# Patient Record
Sex: FEMALE | Race: WHITE | URBAN - METROPOLITAN AREA
[De-identification: names, ages, dates, MRNs, and addresses within clinical notes are randomized per-mention and may not be internally consistent; named-entity substitution may affect disease eponyms.]

---

## 2018-01-03 ENCOUNTER — OFFICE VISIT (OUTPATIENT)
Dept: FAMILY MEDICINE | Facility: CLINIC | Age: 23
End: 2018-01-03
Payer: COMMERCIAL

## 2018-01-03 VITALS
OXYGEN SATURATION: 99 % | WEIGHT: 123.6 LBS | HEIGHT: 68 IN | BODY MASS INDEX: 18.73 KG/M2 | HEART RATE: 51 BPM | DIASTOLIC BLOOD PRESSURE: 71 MMHG | TEMPERATURE: 98.3 F | SYSTOLIC BLOOD PRESSURE: 110 MMHG

## 2018-01-03 DIAGNOSIS — E55.9 VITAMIN D DEFICIENCY: ICD-10-CM

## 2018-01-03 DIAGNOSIS — R53.83 FATIGUE, UNSPECIFIED TYPE: ICD-10-CM

## 2018-01-03 DIAGNOSIS — F43.23 ADJUSTMENT DISORDER WITH MIXED ANXIETY AND DEPRESSED MOOD: Primary | ICD-10-CM

## 2018-01-03 LAB
BASOPHILS # BLD AUTO: 0 10E9/L (ref 0–0.2)
BASOPHILS NFR BLD AUTO: 0.5 %
DIFFERENTIAL METHOD BLD: NORMAL
EOSINOPHIL # BLD AUTO: 0.2 10E9/L (ref 0–0.7)
EOSINOPHIL NFR BLD AUTO: 3.2 %
ERYTHROCYTE [DISTWIDTH] IN BLOOD BY AUTOMATED COUNT: 12.1 % (ref 10–15)
HCT VFR BLD AUTO: 40.6 % (ref 35–47)
HGB BLD-MCNC: 13.5 G/DL (ref 11.7–15.7)
LYMPHOCYTES # BLD AUTO: 1.9 10E9/L (ref 0.8–5.3)
LYMPHOCYTES NFR BLD AUTO: 28.7 %
MCH RBC QN AUTO: 29.3 PG (ref 26.5–33)
MCHC RBC AUTO-ENTMCNC: 33.3 G/DL (ref 31.5–36.5)
MCV RBC AUTO: 88 FL (ref 78–100)
MONOCYTES # BLD AUTO: 0.6 10E9/L (ref 0–1.3)
MONOCYTES NFR BLD AUTO: 9.2 %
NEUTROPHILS # BLD AUTO: 3.9 10E9/L (ref 1.6–8.3)
NEUTROPHILS NFR BLD AUTO: 58.4 %
PLATELET # BLD AUTO: 247 10E9/L (ref 150–450)
RBC # BLD AUTO: 4.61 10E12/L (ref 3.8–5.2)
WBC # BLD AUTO: 6.6 10E9/L (ref 4–11)

## 2018-01-03 PROCEDURE — 82728 ASSAY OF FERRITIN: CPT | Performed by: PHYSICIAN ASSISTANT

## 2018-01-03 PROCEDURE — 36415 COLL VENOUS BLD VENIPUNCTURE: CPT | Performed by: PHYSICIAN ASSISTANT

## 2018-01-03 PROCEDURE — 80050 GENERAL HEALTH PANEL: CPT | Performed by: PHYSICIAN ASSISTANT

## 2018-01-03 PROCEDURE — 99214 OFFICE O/P EST MOD 30 MIN: CPT | Performed by: PHYSICIAN ASSISTANT

## 2018-01-03 PROCEDURE — 82306 VITAMIN D 25 HYDROXY: CPT | Performed by: PHYSICIAN ASSISTANT

## 2018-01-03 ASSESSMENT — PATIENT HEALTH QUESTIONNAIRE - PHQ9
5. POOR APPETITE OR OVEREATING: SEVERAL DAYS
SUM OF ALL RESPONSES TO PHQ QUESTIONS 1-9: 7

## 2018-01-03 ASSESSMENT — ANXIETY QUESTIONNAIRES
3. WORRYING TOO MUCH ABOUT DIFFERENT THINGS: NOT AT ALL
6. BECOMING EASILY ANNOYED OR IRRITABLE: MORE THAN HALF THE DAYS
2. NOT BEING ABLE TO STOP OR CONTROL WORRYING: MORE THAN HALF THE DAYS
5. BEING SO RESTLESS THAT IT IS HARD TO SIT STILL: NOT AT ALL
1. FEELING NERVOUS, ANXIOUS, OR ON EDGE: SEVERAL DAYS
GAD7 TOTAL SCORE: 6
7. FEELING AFRAID AS IF SOMETHING AWFUL MIGHT HAPPEN: NOT AT ALL

## 2018-01-03 NOTE — PROGRESS NOTES
SUBJECTIVE:                                                    Barb Middleton is a 22 year old female who presents to clinic today for the following health issues:    Abnormal Mood Symptoms  Onset: Started in October    Description:   Depression: YES  Anxiety: YES    Accompanying Signs & Symptoms:  Still participating in activities that you used to enjoy: YES    Fatigue: YES- has always been tired and sleeps a lot which she said that has been normal for her. Always takes naps  Irritability: YES  Difficulty concentrating: YES  Changes in appetite: no  Problems with sleep: will sometimes have difficulties fall asleep  Heart racing/beating fast : no  Thoughts of hurting yourself or others: sometimes pictures things in her head     History:   Recent stress: YES- with school and one teacher who has been giving her a really hard time  Prior depression hospitalization: None  Family history of depression: no  Family history of anxiety: no    Precipitating factors:   Alcohol/drug use: no    Alleviating factors:  None    Therapies Tried and outcome: None    [employer], 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 She thinks about her family  Denies drug, alcohol use  Likes to run on treadmill, yoga, group exercise classes  Wants to do counseling  She broke up with her boyfriend, had falling out with roommate, and then issues with this professor. She had brought in homework late, was crying in his office, and he wasn't working with her to remedy situation    She usually does better spring semester  Doesn't feel like she has many friends at school, or friends she is close enough to talk about this with   She normally cries easily.    Problem list and histories reviewed & adjusted, as indicated.  Additional history: none    Patient Active Problem List   Diagnosis      "Epistaxis     CARDIOVASCULAR SCREENING; LDL GOAL LESS THAN 160     History reviewed. No pertinent surgical history.    Social History   Substance Use Topics     Smoking status: Never Smoker     Smokeless tobacco: Never Used     Alcohol use No     Family History   Problem Relation Age of Onset     Depression Cousin              ROS:  Other than noted above, general, HEENT, respiratory, cardiac, MS, and gastrointestinal systems are negative.     OBJECTIVE:                                                    /71 (BP Location: Right arm, Patient Position: Sitting, Cuff Size: Adult Regular)  Pulse 51  Temp 98.3  F (36.8  C) (Tympanic)  Ht 5' 8.25\" (1.734 m)  Wt 123 lb 9.6 oz (56.1 kg)  LMP 12/24/2017  SpO2 99%  BMI 18.66 kg/m2 Body mass index is 18.66 kg/(m^2).   GENERAL: healthy, alert, well nourished, well hydrated, no distress  RESP: lungs clear to auscultation - no rales, no rhonchi, no wheezes  CV: regular rates and rhythm, normal S1 S2, no S3 or S4 and no murmur, no click or rub -  ABDOMEN: soft, no tenderness, no  hepatosplenomegaly, no masses, normal bowel sounds  PSYCH: Alert and oriented times 3; speech- coherent , normal rate and volume; able to articulate logical thoughts, able to abstract reason, no tangential thoughts, no hallucinations or delusions, affect- tearful       ASSESSMENT/PLAN:                                                      ASSESSMENT/PLAN:      ICD-10-CM    1. Adjustment disorder with mixed anxiety and depressed mood F43.23    2. Fatigue, unspecified type R53.83 CBC with platelets differential     Comprehensive metabolic panel     Ferritin     TSH with free T4 reflex     Vitamin D Deficiency   3. Vitamin D deficiency E55.9 **Vitamin D Deficiency FUTURE anytime     cholecalciferol (VITAMIN D3) 85303 UNITS capsule     Likely adjustment/reaction to stressful situations; she is trying to work on some of these situations. Will check baseline labwork.  We discussed options including " SSRI - patient defers at this time. She would like to start counseling back at school, encouraged this. Discussed management of stress/depression. Follow up here or with student health as needed.    Patient Instructions     When you're feeling overwhelmed: mindfulness techniques    MY DEPRESSION SURVIVAL KIT    30 minutes was spent face to face with the patient today discussing history, diagnosis, and follow-up plan as noted above. Over 50% of the visit was spent in counseling and coordination of care. Total Visit Time: 30 minutes.     Azul Amador PA-C   [facility]

## 2018-01-03 NOTE — MR AVS SNAPSHOT
After Visit Summary   1/3/2018    Barb Middleton    MRN: [id number]           Patient Information     Date Of Birth          1995        Visit Information        Provider Department      1/3/2018 1:20 PM Azul Amador PA-C [facility]        Today's Diagnoses     Fatigue, unspecified type    -  1      Care Instructions    When you're feeling overwhelmed:  1. Deep breathing from your diaphragm. Put your hand over your belly if that helps. Breathe in through your nose, hold, out through your mouth  2. Muscle tension/relaxation - squeeze your fists/forearms then release them to release tension  3. Grounding yourself. 5-4-3-2-1. 5- things you see, 4- things you feel, 3- things you hear, 2- things you taste/smell, 1- how am I feeling? What's one good thing about myself?  4. Thoughts turn to feelings turn to actions. You'll notice this when a negative thought can make you feel anxious or down, and in turn you act differently.  5. So turn around the negative thought by counteracting it with a positive one. What can the positive side of you say to that negative thought?  7. Think: when I worry I feel like I'm accomplishing something but I'm not. When is a good time to worry, when is a bad time?    Visit www.helpguide.org for stress tools  Visit www.stressTrly Uniq.[employer] for guided meditation    MY DEPRESSION SURVIVAL KIT    If you feel you are a danger to yourself or others, and you have a plan to hurt yourself: please call 911, go to ER, or call suicide hotline 1-516.248.9441    My Support System    My Support System     Friend 1     Friend 2     Friend 3     Family 1     Family 2     [employer]/     Therapist     24 Hr Suicide prevention hotline Toll free 1-159-644-TALK(0702)        Resource         A  feel good  movie: (something that makes you laugh, gives you hope).  Power music: (tunes that make you feel energized).   A list of things that make you feel good and relaxed  "(buying flowers, getting a massage, playing with puppies or kittens )      DEPRESSION: PATIENT SELF CARE PLAN  Here are some suggestions that have helped others with depression and may be helpful for you.    Medication    Take your medication as prescribed.  Missing doses can result in serious side effects. Do not stop your medication abruptly without first discussing it with your provider.  If you have side effects, or do not like the medication call your provider.    Get Quality Sleep    Control your stress    Try breathing deep, Take a deep breath while counting to 4, hold your breath for 3 and then slowly let it out counting to 4.      Read reputable self-help books and websites   www.helpUvinum.org  http://www.helpUvinum.org/mental/stress_relief_meditation_yoga_relaxation.htm  Several websites, such as [employer], Blue Pages, and [employer], have been shown in randomized trials to help improve depressive symptoms    Hygiene   Maintain good hygiene (Get out of bed in the morning, Make your bed, brush your teeth, shave, take a shower, and get dressed like you were going to work, even if you are unemployed).  If your clothes don't fit try to get ones that do.    Exercise  Get some form of exercise, every day. This will help reduce pain and release endorphins, the  feel good  chemicals in your brain. This is almost as good as taking antidepressants! The hardest part about exercising is getting started.  It can be as simple as just going for a walk or doing some gardening, dancing, biking, anything that will get you moving.  It is easy to find excuses not to exercise especially when you are depressed, so make a point of at least starting to exercise no matter what.     Staying Connected With Others  Stay in touch with friends, family members, and your primary care provider/team.  Have a \"check in\" with someone every day.  Take turns talking about the best and worst part of your day, and something you are proud of you did that " day.    Get out of the house regularly, such as a simple trip to the library, grocery store, take in a presentation at your [employer], community education, or go to a school event such as high school basketball game or play.    Use your imagination Be creative.  We all have a creative side; it doesn t matter if it s oil painting, sand castles, or mud pies! This will also kick up the endorphins.      Witness Beauty  (AKA stop and smell the roses) Take a look outside, even in mid-winter. Notice colors, textures. Watch the squirrels and birds.  Notice and look for the goodness in others.      Counseling  [facility] [phone]    Service to others  Be of service to others.  There is always someone else in need.  By helping others we can  get out of ourselves  and remember the really important things.  This also provides opportunities for practicing all the other parts of the program.    Humor  Laugh and be silly!  Adjust your TV habits for less news and crime-drama and more comedy.    Diet  Some foods that are helpful in depression are: fish or fish oil, fresh fruits and vegetables. Blueberries and other berries are comfort foods that are good for you.      Consider writing in a journal    Journaling can improve mood by allowing you to express pain, anger, fear or other emotions.     Join a support group  Connecting with others facing similar challenges can help you cope. Local support groups for depression are available in many communities, and support groups for depression are also offered online.     Don't make important decisions when you're down  Avoid decision making when you're feeling very depressed, since you may not be thinking clearly.             Follow-ups after your visit        Who to contact     Normal or non-critical lab and imaging results will be communicated to you by MyChart, letter or phone within 4 business days after the clinic has received the results. If you do not hear from us  "within 7 days, please contact the clinic through Evernote or phone. If you have a critical or abnormal lab result, we will notify you by phone as soon as possible.  Submit refill requests through Evernote or call your pharmacy and they will forward the refill request to us. Please allow 3 business days for your refill to be completed.          If you need to speak with a  for additional information , please call: [phone]             Additional Information About Your Visit        Evernote Information     Evernote lets you send messages to your doctor, view your test results, renew your prescriptions, schedule appointments and more. To sign up, go to [email or web address] . Click on \"Log in\" on the left side of the screen, which will take you to the Welcome page. Then click on \"Sign up Now\" on the right side of the page.     You will be asked to enter the access code listed below, as well as some personal information. Please follow the directions to create your username and password.     Your access code is: [email or web address]  Expires: 4/3/2018  2:29 PM     Your access code will  in 90 days. If you need help or a new code, please call your [facility] clinic or [phone].        Care EveryWhere ID     This is your Care EveryWhere ID. This could be used by other organizations to access your [facility] medical records  [id number]        Your Vitals Were     Pulse Temperature Height Last Period Pulse Oximetry BMI (Body Mass Index)    51 98.3  F (36.8  C) (Tympanic) 5' 8.25\" (1.734 m) 2017 99% 18.66 kg/m2       Blood Pressure from Last 3 Encounters:   18 110/71   16 96/59   08/04/15 94/66    Weight from Last 3 Encounters:   18 123 lb 9.6 oz (56.1 kg)   16 123 lb 6.4 oz (56 kg) (43 %)*   08/04/15 122 lb (55.3 kg) (43 %)*     * Growth percentiles are based on CDC 2-20 Years data.              We Performed the Following     CBC with platelets differential     Comprehensive " metabolic panel     Ferritin     TSH with free T4 reflex     Vitamin D Deficiency        Primary Care Provider Office Phone # Fax #    [facility] [phone] [address] [facility] [address]        Equal Access to Services     VINH MCDOWELL : Hadii aad ku haddottieo Sogalinaali, waaxda luqadaha, qaybta kaalmada adeegyada, ml crockettn gasper hughes yuri frederick. So [facility] [phone].    ATENCIÓN: Si habla español, tiene a hawk disposición servicios gratuitos de asistencia lingüística. Llame al [phone].    We comply with applicable federal civil rights laws and [place] laws. We do not discriminate on the basis of race, color, national origin, age, disability, sex, sexual orientation, or gender identity.            Thank you!     Thank you for choosing [facility]  for your care. Our goal is always to provide you with excellent care. Hearing back from our patients is one way we can continue to improve our services. Please take a few minutes to complete the written survey that you may receive in the mail after your visit with us. Thank you!             Your Updated Medication List - Protect others around you: Learn how to safely use, store and throw away your medicines at www.disposemymeds.org.      Notice  As of 1/3/2018  2:29 PM    You have not been prescribed any medications.

## 2018-01-03 NOTE — PATIENT INSTRUCTIONS
When you're feeling overwhelmed:  1. Deep breathing from your diaphragm. Put your hand over your belly if that helps. Breathe in through your nose, hold, out through your mouth  2. Muscle tension/relaxation - squeeze your fists/forearms then release them to release tension  3. Grounding yourself. 5-4-3-2-1. 5- things you see, 4- things you feel, 3- things you hear, 2- things you taste/smell, 1- how am I feeling? What's one good thing about myself?  4. Thoughts turn to feelings turn to actions. You'll notice this when a negative thought can make you feel anxious or down, and in turn you act differently.  5. So turn around the negative thought by counteracting it with a positive one. What can the positive side of you say to that negative thought?  7. Think: when I worry I feel like I'm accomplishing something but I'm not. When is a good time to worry, when is a bad time?    Visit www.helpRxVault.inide.org for stress tools  Visit www.stressNimble.[employer] for guided meditation    MY DEPRESSION SURVIVAL KIT    If you feel you are a danger to yourself or others, and you have a plan to hurt yourself: please call 911, go to ER, or call suicide hotline 1-819.603.1692    My Support System    My Support System     Friend 1     Friend 2     Friend 3     Family 1     Family 2     [employer]/     Therapist     24 Hr Suicide prevention hotline Toll free 9-006-521-TALK(3791)        Resource         A  feel good  movie: (something that makes you laugh, gives you hope).  Power music: (tunes that make you feel energized).   A list of things that make you feel good and relaxed (buying flowers, getting a massage, playing with puppies or kittens )      DEPRESSION: PATIENT SELF CARE PLAN  Here are some suggestions that have helped others with depression and may be helpful for you.    Medication    Take your medication as prescribed.  Missing doses can result in serious side effects. Do not stop your medication abruptly without first  "discussing it with your provider.  If you have side effects, or do not like the medication call your provider.    Get Quality Sleep    Control your stress    Try breathing deep, Take a deep breath while counting to 4, hold your breath for 3 and then slowly let it out counting to 4.      Read reputable self-help books and websites   www.helpguide.org  http://www.helpguide.org/mental/stress_relief_meditation_yoga_relaxation.htm  Several websites, such as [employer], Blue Pages, and [employer], have been shown in randomized trials to help improve depressive symptoms    Hygiene   Maintain good hygiene (Get out of bed in the morning, Make your bed, brush your teeth, shave, take a shower, and get dressed like you were going to work, even if you are unemployed).  If your clothes don't fit try to get ones that do.    Exercise  Get some form of exercise, every day. This will help reduce pain and release endorphins, the  feel good  chemicals in your brain. This is almost as good as taking antidepressants! The hardest part about exercising is getting started.  It can be as simple as just going for a walk or doing some gardening, dancing, biking, anything that will get you moving.  It is easy to find excuses not to exercise especially when you are depressed, so make a point of at least starting to exercise no matter what.     Staying Connected With Others  Stay in touch with friends, family members, and your primary care provider/team.  Have a \"check in\" with someone every day.  Take turns talking about the best and worst part of your day, and something you are proud of you did that day.    Get out of the house regularly, such as a simple trip to the library, grocery store, take in a presentation at your [employer], community education, or go to a school event such as high school basketball game or play.    Use your imagination Be creative.  We all have a creative side; it doesn t matter if it s oil painting, sand castles, or mud pies! This " will also kick up the endorphins.      Witness Beauty  (AKA stop and smell the roses) Take a look outside, even in mid-winter. Notice colors, textures. Watch the squirrels and birds.  Notice and look for the goodness in others.      Counseling  [facility] [phone]    Service to others  Be of service to others.  There is always someone else in need.  By helping others we can  get out of ourselves  and remember the really important things.  This also provides opportunities for practicing all the other parts of the program.    Humor  Laugh and be silly!  Adjust your TV habits for less news and crime-drama and more comedy.    Diet  Some foods that are helpful in depression are: fish or fish oil, fresh fruits and vegetables. Blueberries and other berries are comfort foods that are good for you.      Consider writing in a journal    Journaling can improve mood by allowing you to express pain, anger, fear or other emotions.     Join a support group  Connecting with others facing similar challenges can help you cope. Local support groups for depression are available in many communities, and support groups for depression are also offered online.     Don't make important decisions when you're down  Avoid decision making when you're feeling very depressed, since you may not be thinking clearly.

## 2018-01-03 NOTE — LETTER
[facility]  [address] [address]  [phone]        January 14, 2019    Barb Middleton  [address] [address]              Dear Barb Middleton    This is to remind you that your lab is due.    You may call our office at [phone] to schedule an appointment.    Please disregard this notice if you have already had your labs drawn or made an appointment.        Sincerely,        Azul Amador PA-C

## 2018-01-04 LAB
ALBUMIN SERPL-MCNC: 4.2 G/DL (ref 3.4–5)
ALP SERPL-CCNC: 65 U/L (ref 40–150)
ALT SERPL W P-5'-P-CCNC: 24 U/L (ref 0–50)
ANION GAP SERPL CALCULATED.3IONS-SCNC: 6 MMOL/L (ref 3–14)
AST SERPL W P-5'-P-CCNC: 20 U/L (ref 0–45)
BILIRUB SERPL-MCNC: 1.2 MG/DL (ref 0.2–1.3)
BUN SERPL-MCNC: 14 MG/DL (ref 7–30)
CALCIUM SERPL-MCNC: 9.2 MG/DL (ref 8.5–10.1)
CHLORIDE SERPL-SCNC: 103 MMOL/L (ref 94–109)
CO2 SERPL-SCNC: 27 MMOL/L (ref 20–32)
CREAT SERPL-MCNC: 0.62 MG/DL (ref 0.52–1.04)
DEPRECATED CALCIDIOL+CALCIFEROL SERPL-MC: 11 UG/L (ref 20–75)
FERRITIN SERPL-MCNC: 34 NG/ML (ref 12–150)
GFR SERPL CREATININE-BSD FRML MDRD: >90 ML/MIN/1.7M2
GLUCOSE SERPL-MCNC: 77 MG/DL (ref 70–99)
POTASSIUM SERPL-SCNC: 3.9 MMOL/L (ref 3.4–5.3)
PROT SERPL-MCNC: 8.1 G/DL (ref 6.8–8.8)
SODIUM SERPL-SCNC: 136 MMOL/L (ref 133–144)
TSH SERPL DL<=0.005 MIU/L-ACNC: 1.75 MU/L (ref 0.4–4)

## 2018-01-04 ASSESSMENT — ANXIETY QUESTIONNAIRES: GAD7 TOTAL SCORE: 6

## 2019-02-19 ENCOUNTER — TELEPHONE (OUTPATIENT)
Dept: FAMILY MEDICINE | Facility: CLINIC | Age: 24
End: 2019-02-19

## 2021-05-26 ENCOUNTER — RECORDS - HEALTHEAST (OUTPATIENT)
Dept: ADMINISTRATIVE | Facility: CLINIC | Age: 26
End: 2021-05-26